# Patient Record
Sex: FEMALE | Race: WHITE | NOT HISPANIC OR LATINO | URBAN - METROPOLITAN AREA
[De-identification: names, ages, dates, MRNs, and addresses within clinical notes are randomized per-mention and may not be internally consistent; named-entity substitution may affect disease eponyms.]

---

## 2019-10-07 ENCOUNTER — OUTPATIENT (OUTPATIENT)
Dept: OUTPATIENT SERVICES | Facility: HOSPITAL | Age: 29
LOS: 1 days | End: 2019-10-07
Payer: COMMERCIAL

## 2019-10-07 DIAGNOSIS — M54.12 RADICULOPATHY, CERVICAL REGION: ICD-10-CM

## 2019-10-07 PROCEDURE — 62321 NJX INTERLAMINAR CRV/THRC: CPT

## 2019-10-07 PROCEDURE — 77003 FLUOROGUIDE FOR SPINE INJECT: CPT

## 2021-08-09 ENCOUNTER — RESULT REVIEW (OUTPATIENT)
Age: 31
End: 2021-08-09

## 2022-09-26 ENCOUNTER — RESULT REVIEW (OUTPATIENT)
Age: 32
End: 2022-09-26

## 2022-10-18 ENCOUNTER — APPOINTMENT (OUTPATIENT)
Dept: GASTROENTEROLOGY | Facility: CLINIC | Age: 32
End: 2022-10-18

## 2022-10-18 VITALS
HEART RATE: 76 BPM | WEIGHT: 171 LBS | OXYGEN SATURATION: 98 % | HEIGHT: 63 IN | BODY MASS INDEX: 30.3 KG/M2 | TEMPERATURE: 97.5 F | DIASTOLIC BLOOD PRESSURE: 80 MMHG | SYSTOLIC BLOOD PRESSURE: 120 MMHG

## 2022-10-18 DIAGNOSIS — R10.13 EPIGASTRIC PAIN: ICD-10-CM

## 2022-10-18 DIAGNOSIS — Z78.9 OTHER SPECIFIED HEALTH STATUS: ICD-10-CM

## 2022-10-18 DIAGNOSIS — Z82.49 FAMILY HISTORY OF ISCHEMIC HEART DISEASE AND OTHER DISEASES OF THE CIRCULATORY SYSTEM: ICD-10-CM

## 2022-10-18 DIAGNOSIS — R10.32 LEFT LOWER QUADRANT PAIN: ICD-10-CM

## 2022-10-18 DIAGNOSIS — Z80.3 FAMILY HISTORY OF MALIGNANT NEOPLASM OF BREAST: ICD-10-CM

## 2022-10-18 PROBLEM — Z00.00 ENCOUNTER FOR PREVENTIVE HEALTH EXAMINATION: Status: ACTIVE | Noted: 2022-10-18

## 2022-10-18 PROCEDURE — 99204 OFFICE O/P NEW MOD 45 MIN: CPT | Mod: 25

## 2022-10-18 PROCEDURE — 36415 COLL VENOUS BLD VENIPUNCTURE: CPT

## 2022-10-18 RX ORDER — SODIUM PICOSULFATE, MAGNESIUM OXIDE, AND ANHYDROUS CITRIC ACID 10; 3.5; 12 MG/160ML; G/160ML; G/160ML
10-3.5-12 MG-GM LIQUID ORAL
Qty: 1 | Refills: 0 | Status: ACTIVE | COMMUNITY
Start: 2022-10-18 | End: 1900-01-01

## 2022-10-18 RX ORDER — ALPHA-D-GALACTOSIDASE 400 UNIT
TABLET ORAL
Refills: 0 | Status: ACTIVE | COMMUNITY

## 2022-10-18 NOTE — REASON FOR VISIT
[Initial Evaluation] : an initial evaluation [FreeTextEntry1] : Diarrhea, epigastric pain, left lower quadrant pain

## 2022-10-18 NOTE — HISTORY OF PRESENT ILLNESS
[FreeTextEntry1] : The patient is a 32-year-old woman who has had 6 6 to 7 weeks of diarrhea.  She has 2-3 bowel movements a day which are mostly loose and sometimes soft.  The stools are not formed or solid.  She denies melena or bright red blood per rectum.  She has a baseline of 1-2 solid bowel movements a day.  She also reports that over the past 2 weeks, she has had epigastric pain usually occurring at night.  This sometimes awakens her in the middle the night from sleep.  She also has had dull pain in the left lower quadrant for the past 6 weeks.  She had COVID-19 via a home test 1 week after the diarrhea began with fever and fatigue that lasted 7 days.  All of those symptoms have resolved but the symptoms have persisted.  The patient denies any recent travel.  She was on antibiotics for 5 days for a skin infection in July.  She takes intermittent Advil.  She has gained 12 pounds over the past year.  The patient has not been admitted to the hospital in the past year and denies any cardiac issues.

## 2022-10-18 NOTE — PHYSICAL EXAM
[None] : no edema [No CVA Tenderness] : no CVA  tenderness [Normal] : oriented to person, place, and time

## 2022-10-18 NOTE — ASSESSMENT
[FreeTextEntry1] : Patient with 6 to 7 weeks of diarrhea.  She had COVID 1 week after the diarrhea started and is possible that it her current symptoms are related to that but we cannot make that assumption.  She also has 2 weeks of epigastric pain which awakens her from sleep.  We must consider peptic disease.  She also has had left lower quadrant dull pain associated with the diarrhea.\par \par We will rule out an infectious cause.  Stool studies will be sent for a GI infectious PCR panel and C. diff by PCR.\par \par Bloodwork was sent for CBC, chem-pack, TSH, celiac markers, amylase, lipase.\par \par If the above is unrevealing and the symptoms persist, the neck step will be EGD and colonoscopy.  An EGD and colonoscopy have been scheduled. The risks, benefits, alternatives, and limitations of the procedures, including the possibility of missed lesions, were explained.

## 2022-10-20 LAB
ALBUMIN SERPL ELPH-MCNC: 5.2 G/DL
ALP BLD-CCNC: 74 U/L
ALT SERPL-CCNC: 19 U/L
AMYLASE/CREAT SERPL: 64 U/L
ANION GAP SERPL CALC-SCNC: 14 MMOL/L
AST SERPL-CCNC: 14 U/L
BASOPHILS # BLD AUTO: 0.04 K/UL
BASOPHILS NFR BLD AUTO: 0.6 %
BILIRUB SERPL-MCNC: 0.2 MG/DL
BUN SERPL-MCNC: 9 MG/DL
CALCIUM SERPL-MCNC: 10.4 MG/DL
CHLORIDE SERPL-SCNC: 103 MMOL/L
CO2 SERPL-SCNC: 25 MMOL/L
CREAT SERPL-MCNC: 0.84 MG/DL
EGFR: 94 ML/MIN/1.73M2
ENDOMYSIUM IGA SER QL: NEGATIVE
ENDOMYSIUM IGA TITR SER: NORMAL
EOSINOPHIL # BLD AUTO: 0.13 K/UL
EOSINOPHIL NFR BLD AUTO: 1.8 %
GGT SERPL-CCNC: 22 U/L
GLIADIN IGA SER QL: <5 UNITS
GLIADIN IGG SER QL: <5 UNITS
GLIADIN PEPTIDE IGA SER-ACNC: NEGATIVE
GLIADIN PEPTIDE IGG SER-ACNC: NEGATIVE
GLUCOSE SERPL-MCNC: 97 MG/DL
HCT VFR BLD CALC: 41.8 %
HGB BLD-MCNC: 13.6 G/DL
IGA SER QL IEP: 207 MG/DL
IMM GRANULOCYTES NFR BLD AUTO: 0.1 %
LPL SERPL-CCNC: 35 U/L
LYMPHOCYTES # BLD AUTO: 2.44 K/UL
LYMPHOCYTES NFR BLD AUTO: 34.7 %
MAN DIFF?: NORMAL
MCHC RBC-ENTMCNC: 26.4 PG
MCHC RBC-ENTMCNC: 32.5 GM/DL
MCV RBC AUTO: 81 FL
MONOCYTES # BLD AUTO: 0.4 K/UL
MONOCYTES NFR BLD AUTO: 5.7 %
NEUTROPHILS # BLD AUTO: 4.02 K/UL
NEUTROPHILS NFR BLD AUTO: 57.1 %
PLATELET # BLD AUTO: 380 K/UL
POTASSIUM SERPL-SCNC: 4.3 MMOL/L
PROT SERPL-MCNC: 8.1 G/DL
RBC # BLD: 5.16 M/UL
RBC # FLD: 12.8 %
SODIUM SERPL-SCNC: 142 MMOL/L
TSH SERPL-ACNC: 1.54 UIU/ML
TTG IGA SER IA-ACNC: <1.2 U/ML
TTG IGA SER-ACNC: NEGATIVE
TTG IGG SER IA-ACNC: <1.2 U/ML
TTG IGG SER IA-ACNC: NEGATIVE
WBC # FLD AUTO: 7.04 K/UL

## 2022-10-21 ENCOUNTER — NON-APPOINTMENT (OUTPATIENT)
Age: 32
End: 2022-10-21

## 2022-10-23 LAB
C DIFF TOX GENS STL QL NAA+PROBE: NORMAL
CDIFF BY PCR: NOT DETECTED
GI PCR PANEL: NOT DETECTED

## 2022-10-24 ENCOUNTER — NON-APPOINTMENT (OUTPATIENT)
Age: 32
End: 2022-10-24

## 2022-11-28 DIAGNOSIS — Z20.822 ENCOUNTER FOR PREPROCEDURAL LABORATORY EXAMINATION: ICD-10-CM

## 2022-11-28 DIAGNOSIS — Z01.812 ENCOUNTER FOR PREPROCEDURAL LABORATORY EXAMINATION: ICD-10-CM

## 2022-12-23 ENCOUNTER — APPOINTMENT (OUTPATIENT)
Dept: GASTROENTEROLOGY | Facility: AMBULATORY MEDICAL SERVICES | Age: 32
End: 2022-12-23

## 2022-12-23 PROCEDURE — 45380 COLONOSCOPY AND BIOPSY: CPT

## 2023-01-18 ENCOUNTER — APPOINTMENT (OUTPATIENT)
Dept: GASTROENTEROLOGY | Facility: CLINIC | Age: 33
End: 2023-01-18
Payer: COMMERCIAL

## 2023-01-18 DIAGNOSIS — K64.8 OTHER HEMORRHOIDS: ICD-10-CM

## 2023-01-18 DIAGNOSIS — R19.7 DIARRHEA, UNSPECIFIED: ICD-10-CM

## 2023-01-18 DIAGNOSIS — K64.4 RESIDUAL HEMORRHOIDAL SKIN TAGS: ICD-10-CM

## 2023-01-18 DIAGNOSIS — K52.839 MICROSCOPIC COLITIS, UNSPECIFIED: ICD-10-CM

## 2023-01-18 PROCEDURE — 99442: CPT

## 2023-01-18 NOTE — HISTORY OF PRESENT ILLNESS
[Home] : at home, [unfilled] , at the time of the visit. [Medical Office: ([facility])___] : at the medical office located in  [Verbal consent obtained from patient] : the patient, [unfilled] [FreeTextEntry1] : This was a telephonic visit.  We reviewed the evaluations done since the patient's last visit on October 18, 2022.  Blood work from that day was normal.  Stool studies were negative for infection.  The patient had ongoing diarrhea and underwent colonoscopy on December 23, 2022.  The exam was grossly normal other than internal and external hemorrhoids which are asymptomatic.  Biopsies from the right and left colon showed increased intraepithelial lymphocytes and changes consistent with microscopic colitis.  The patient has changed her diet and is eating more protein and eating healthier.  She states that over the past 2 weeks, her bowel movements have returned to normal.  She is having 1-2 solid bowel movements a day.  She denies melena or bright red blood per rectum.  She denies any further abdominal pain.  She denies heartburn or dysphagia.\par \par \par Note from 10/18/2022 - The patient is a 32-year-old woman who has had 6 6 to 7 weeks of diarrhea.  She has 2-3 bowel movements a day which are mostly loose and sometimes soft.  The stools are not formed or solid.  She denies melena or bright red blood per rectum.  She has a baseline of 1-2 solid bowel movements a day.  She also reports that over the past 2 weeks, she has had epigastric pain usually occurring at night.  This sometimes awakens her in the middle the night from sleep.  She also has had dull pain in the left lower quadrant for the past 6 weeks.  She had COVID-19 via a home test 1 week after the diarrhea began with fever and fatigue that lasted 7 days.  All of those symptoms have resolved but the symptoms have persisted.  The patient denies any recent travel.  She was on antibiotics for 5 days for a skin infection in July.  She takes intermittent Advil.  She has gained 12 pounds over the past year.  The patient has not been admitted to the hospital in the past year and denies any cardiac issues.

## 2023-01-18 NOTE — ASSESSMENT
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  The patient's diarrhea has since resolved and her bowel movements have returned to normal.\par \par I discussed microscopic colitis with the patient.  I also discussed the possibility that the biopsy findings were actually due to a resolving colitis that the patient may have been improving from.  In either event, as long as the patient is asymptomatic, no intervention is needed.\par \par We will continue to observe the patient's bowel movements.  She will contact me if her diarrhea returns.\par \par \par Plan from 10/18/2022 - Patient with 6 to 7 weeks of diarrhea.  She had COVID 1 week after the diarrhea started and is possible that it her current symptoms are related to that but we cannot make that assumption.  She also has 2 weeks of epigastric pain which awakens her from sleep.  We must consider peptic disease.  She also has had left lower quadrant dull pain associated with the diarrhea.\par \par We will rule out an infectious cause.  Stool studies will be sent for a GI infectious PCR panel and C. diff by PCR.\par \par Bloodwork was sent for CBC, chem-pack, TSH, celiac markers, amylase, lipase.\par \par If the above is unrevealing and the symptoms persist, the neck step will be EGD and colonoscopy.  An EGD and colonoscopy have been scheduled. The risks, benefits, alternatives, and limitations of the procedures, including the possibility of missed lesions, were explained.